# Patient Record
Sex: FEMALE | Race: WHITE
[De-identification: names, ages, dates, MRNs, and addresses within clinical notes are randomized per-mention and may not be internally consistent; named-entity substitution may affect disease eponyms.]

---

## 2017-02-09 ENCOUNTER — HOSPITAL ENCOUNTER (OUTPATIENT)
Dept: HOSPITAL 58 - LAB | Age: 42
Discharge: HOME | End: 2017-02-09
Attending: NURSE PRACTITIONER

## 2017-02-09 VITALS — BODY MASS INDEX: 29.7 KG/M2

## 2017-02-09 DIAGNOSIS — R05: ICD-10-CM

## 2017-02-09 DIAGNOSIS — J02.9: Primary | ICD-10-CM

## 2017-02-09 LAB
FLU INTERNAL QC: (no result)
FLUAV AG NPH QL: NEGATIVE
FLUBV AG NPH QL: NEGATIVE

## 2017-02-09 PROCEDURE — 87880 STREP A ASSAY W/OPTIC: CPT

## 2017-02-09 PROCEDURE — 87804 INFLUENZA ASSAY W/OPTIC: CPT

## 2017-02-09 PROCEDURE — 87651 STREP A DNA AMP PROBE: CPT

## 2017-03-31 ENCOUNTER — HOSPITAL ENCOUNTER (OUTPATIENT)
Dept: HOSPITAL 58 - RAD | Age: 42
Discharge: HOME | End: 2017-03-31
Attending: SPECIALIST

## 2017-03-31 VITALS — BODY MASS INDEX: 29.7 KG/M2

## 2017-03-31 DIAGNOSIS — R52: ICD-10-CM

## 2017-03-31 DIAGNOSIS — R30.0: ICD-10-CM

## 2017-03-31 DIAGNOSIS — R50.9: ICD-10-CM

## 2017-03-31 DIAGNOSIS — R31.9: Primary | ICD-10-CM

## 2017-03-31 LAB
ADD URINE MICROSCOPIC: YES
ALBUMIN SERPL-MCNC: 4.2 G/DL (ref 3.4–5)
ALBUMIN/GLOB SERPL: 1.4 {RATIO}
ALP SERPL-CCNC: 65 U/L (ref 42–98)
ALT SERPL-CCNC: 12 U/L (ref 12–78)
ANION GAP SERPL CALC-SCNC: 12.6 MMOL/L
AST SERPL-CCNC: 13 U/L (ref 15–37)
BACTERIA URNS QL MICRO: (no result)
BASOPHILS # BLD AUTO: 0.1 K/UL (ref 0–0.2)
BASOPHILS NFR BLD AUTO: 0.4 % (ref 0–3)
BILIRUB SERPL-MCNC: 0.8 MG/DL (ref 0–1.2)
BUN SERPL-MCNC: 7 MG/DL (ref 7–18)
BUN/CREAT SERPL: 8.75
CALCIUM SERPL-MCNC: 9 MG/DL (ref 8.2–10.2)
CHLORIDE SERPL-SCNC: 101 MMOL/L (ref 98–107)
CO2 BLD-SCNC: 28 MMOL/L (ref 21–32)
CREAT SERPL-MCNC: 0.8 MG/DL (ref 0.6–1.3)
EOSINOPHIL # BLD AUTO: 0 K/UL (ref 0–0.7)
EOSINOPHIL NFR BLD AUTO: 0.2 % (ref 0–7)
FLU INTERNAL QC: (no result)
FLUAV AG NPH QL: NEGATIVE
FLUBV AG NPH QL: NEGATIVE
GFR SERPLBLD BASED ON 1.73 SQ M-ARVRAT: 80 ML/MIN
GLOBULIN SER CALC-MCNC: 3 G/L
GLUCOSE SERPL-MCNC: 113 MG/DL (ref 70–110)
HCT VFR BLD AUTO: 41.3 % (ref 37–47)
HGB BLD-MCNC: 14.2 G/DL (ref 12–16)
IMM GRANULOCYTES NFR BLD AUTO: 0.2 % (ref 0–5)
LEUKOCYTE ESTERASE UR QL STRIP.AUTO: (no result)
LYMPHOCYTES # SPEC AUTO: 0.8 K/UL (ref 0.6–3.4)
LYMPHOCYTES NFR BLD AUTO: 6.3 % (ref 10–50)
MCH RBC QN: 28.2 PG (ref 27–31)
MCHC RBC AUTO-ENTMCNC: 34.4 G/DL (ref 31.8–35.4)
MCV RBC: 81.9 FL (ref 81–99)
MONOCYTES # BLD AUTO: 0.8 K/UL (ref 0.4–2)
MONOCYTES NFR BLD AUTO: 6.1 % (ref 0–10)
NEUTROPHILS # BLD AUTO: 11 K/UL (ref 2–6.9)
NEUTROPHILS NFR BLD AUTO: 86.8 %
PH UR: 8.5 [PH] (ref 5–9)
PLATELET # BLD AUTO: 248 10^3/UL (ref 140–440)
POTASSIUM SERPL-SCNC: 3.6 MMOL/L (ref 3.5–5.1)
PROT ?TM UR QN: (no result) G
PROT SERPL-MCNC: 7.2 G/DL (ref 6.4–8.2)
RBC # BLD AUTO: 5.04 10^6/UL (ref 4.2–5.4)
SODIUM SERPL-SCNC: 138 MMOL/L (ref 136–145)
SP GR UR: 1.01 (ref 1–1.03)
WBC # BLD AUTO: 12.7 K/UL (ref 4.6–10.2)

## 2017-03-31 PROCEDURE — 87086 URINE CULTURE/COLONY COUNT: CPT

## 2017-03-31 PROCEDURE — 81001 URINALYSIS AUTO W/SCOPE: CPT

## 2017-03-31 PROCEDURE — 80053 COMPREHEN METABOLIC PANEL: CPT

## 2017-03-31 PROCEDURE — 36415 COLL VENOUS BLD VENIPUNCTURE: CPT

## 2017-03-31 PROCEDURE — 87804 INFLUENZA ASSAY W/OPTIC: CPT

## 2017-03-31 PROCEDURE — 87186 SC STD MICRODIL/AGAR DIL: CPT

## 2017-03-31 PROCEDURE — 85025 COMPLETE CBC W/AUTO DIFF WBC: CPT

## 2017-03-31 NOTE — DI
EXAM:  KUB. 

  

History:  Hematuria. 

  

Comparison:  None available. 

  

Findings: Moderate colonic stool.  Nonobstructive bowel gas pattern.  No free intraperitoneal air.  
No acute osseous abnormalities.  Evaluation of the renal shadows is limited due to obscuration by demi
wel gas.  No definite radiopaque urinary tract calculi are identified. 

  

Impression:  No acute findings.  No definite radiopaque urinary tract calculi.

## 2017-09-15 ENCOUNTER — HOSPITAL ENCOUNTER (EMERGENCY)
Dept: HOSPITAL 58 - ED | Age: 42
Discharge: HOME | End: 2017-09-15

## 2017-09-15 VITALS — SYSTOLIC BLOOD PRESSURE: 150 MMHG | TEMPERATURE: 98 F | DIASTOLIC BLOOD PRESSURE: 97 MMHG

## 2017-09-15 VITALS — BODY MASS INDEX: 29.2 KG/M2

## 2017-09-15 DIAGNOSIS — S40.862A: ICD-10-CM

## 2017-09-15 DIAGNOSIS — S40.861A: ICD-10-CM
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
## 2017-09-15 DIAGNOSIS — S10.96XA: Primary | ICD-10-CM

## 2017-09-15 PROCEDURE — 99282 EMERGENCY DEPT VISIT SF MDM: CPT

## 2017-09-15 NOTE — ED.PDOC
General


ED Provider: 


Dr. BRENT REYES-ER





Chief Complaint: Rash


Stated Complaint: pamela got this rash and its itching--i also had small black bugs


Time Seen by Physician: 21:53


Mode of Arrival: Walk-In


Information Source: Patient


Primary Care Provider: 


MICHAEL MORENO-[facility] [address]





Nursing and Triage Documentation Reviewed and Agree: Yes





Skin Complaint Exam





- Skin Rash/Itching Complaint/Exam


Onset/Duration: 3 days


Symptoms Are: Still present


Initial Severity: Mild


Current Severity: Mild


Location: arms and neck


Potential Exposures: Reports: Insect bite, Mites, Scabies


Prior Treatment: prednisone 20mg


Aggravating: Reports: None


Alleviating: Reports: None


Associated Signs and Symptoms: Denies: Difficulty breathing, Fever, Chills


Skin Findings: Present: Urticaria, Target lesions, Lesions


Differential Diagnoses: Allergic Reaction, Other





Review of Systems





- Review Of Systems


Constitutional: Reports: No symptoms


Eyes: Reports: No symptoms


Ears, Nose, Mouth, Throat: Reports: No symptoms


Respiratory: Reports: No symptoms


Cardiac: Reports: No symptoms


GI: Reports: No symptoms


: Reports: No symptoms


Musculoskeletal: Reports: No symptoms


Skin: Reports: Lumps, Rash


Neurological: Reports: No symptoms


Endocrine: Reports: No symptoms


Hematologic/Lymphatic: Reports: No symptoms


All Other Systems: Reviewed and Negative





Past Medical History





- Past Medical History


Previously Healthy: Yes


Endocrine: Reports: None


Cardiovascular: Reports: Hypertension


Respiratory: Reports: None


Hematological: Reports: None


Gastrointestinal: Reports: None


Genitourinary: Reports: None


Neuro/Psych: Reports: None


Musculoskeletal: Reports: None


Cancer: Reports: None





- Surgical History


General Surgical History: Reports: Hysterectomy, Unknown





- Family History


Family History: Reports: Unknown





- Social History


Smoking Status: Former smoker, Light tobacco smoker


Hx Substance Use: No


Alcohol Screening: Occasionally





Physical Exam





- Physical Exam


Appearance: Well-appearing, No pain distress, Well-nourished


Eyes: REGLA, EOMI, Conjunctiva clear


ENT: Ears normal, Nose normal, Oropharynx normal


Neck: Supple


Respiratory: Airway patent


Cardiovascular: RRR, Pulses normal, No rub, No murmur


GI/: Soft


Musculoskeletal: Normal strength


Skin: Warm


Neurological: Sensation intact, Motor intact, Reflexes intact, Cranial nerves 

intact, Alert, Oriented


Psychiatric: Affect appropriate, Mood appropriate





Critical Care Note





- Critical Care Note


Total Time (mins): 0





Departure





- Departure


Time of Disposition: 21:55


Disposition: HOME SELF-CARE


Discharge Problem: 


Insect bites


Qualifiers:


 Encounter type: initial encounter Qualified Code(s): W57.XXXA - Bitten or 

stung by nonvenomous insect and other nonvenomous arthropods, initial encounter





Instructions:  Insect Bite or Sting (ED)


Condition: Good


Pt referred to PMD for follow-up: Yes


Additional Instructions: 


increase prednisone to 40mg x 3 days then 20mg x 3 days then 10mg x 3 days--

allegra 180mg q daily #30..tagamet 400mg bid #30--can use benadryl 50mg q 4hrs 

prn itching ---f/u clinic next week if not improving


Allergies/Adverse Reactions: 


Allergies





ampicillin Allergy (Intermediate, Verified 10/12/16 21:42)


 rash


methylprednisolone [From Medrol] Allergy (Intermediate, Verified 10/12/16 21:42)


 rash


Penicillins Allergy (Intermediate, Verified 10/12/16 21:42)


 rash


clindamycin Adverse Reaction (Verified 10/12/16 21:42)


 








Home Medications: 


Ambulatory Orders





Meloxicam [Mobic] 7.5 mg PO DAILY 03/24/16 


Hydrocodone Bit/Acetaminophen [Norco 7.5-325] 1 tab PO TID 08/27/16 


Tizanidine HCl [Zanaflex] 4 mg PO BEDTIME 09/14/17 








Disposition Discussed With: Patient

## 2018-02-20 ENCOUNTER — HOSPITAL ENCOUNTER (OUTPATIENT)
Dept: HOSPITAL 58 - LAB | Age: 43
Discharge: HOME | End: 2018-02-20
Attending: INTERNAL MEDICINE
Payer: COMMERCIAL

## 2018-02-20 VITALS — BODY MASS INDEX: 29.2 KG/M2

## 2018-02-20 DIAGNOSIS — R68.89: Primary | ICD-10-CM

## 2018-02-20 DIAGNOSIS — J06.9: ICD-10-CM

## 2018-02-20 PROCEDURE — 87651 STREP A DNA AMP PROBE: CPT

## 2018-02-20 PROCEDURE — 87804 INFLUENZA ASSAY W/OPTIC: CPT

## 2019-02-10 ENCOUNTER — HOSPITAL ENCOUNTER (EMERGENCY)
Dept: HOSPITAL 58 - ED | Age: 44
LOS: 1 days | Discharge: HOME | End: 2019-02-11
Payer: COMMERCIAL

## 2019-02-10 VITALS — BODY MASS INDEX: 38.2 KG/M2

## 2019-02-10 VITALS — TEMPERATURE: 98.7 F

## 2019-02-10 VITALS — SYSTOLIC BLOOD PRESSURE: 179 MMHG | DIASTOLIC BLOOD PRESSURE: 113 MMHG

## 2019-02-10 DIAGNOSIS — I10: ICD-10-CM

## 2019-02-10 DIAGNOSIS — F17.210: ICD-10-CM

## 2019-02-10 DIAGNOSIS — R60.0: Primary | ICD-10-CM

## 2019-02-10 DIAGNOSIS — Z79.899: ICD-10-CM

## 2019-02-10 DIAGNOSIS — I87.2: ICD-10-CM

## 2019-02-10 PROCEDURE — 99282 EMERGENCY DEPT VISIT SF MDM: CPT

## 2019-02-10 NOTE — ED.PDOC
General


ED Provider: 


Dr. ROD NATHAN





Chief Complaint: Extremity Swelling/Pain


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 

and has had some vein surgeries. She states that they have both been more 

swollen lately. cannot find a doctor since she does not want to give up her 

chronic pain medications. She admits to eating fastfoods daily. She tries to 

keep feet elevated and use stockings.


Time Seen by Physician: 23:32


Mode of Arrival: Walk-In


Information Source: Patient


Primary Care Provider: 


PARMJIT HOLT





Nursing and Triage Documentation Reviewed and Agree: Yes


Does patient meet sepsis criteria?: No


If yes, has appropriate treatment been initiated?: No


System Inflammatory Response Syndrome: Not Applicable


Sepsis Protocol: 


For patient's 13 years and over:





Temp is 96.8 and below  and greater


Pulse >90 BPM


Resp >20/minute


Acutely Altered Mental Status





Are patient's symptoms suggestive of a new infection, such as:


   -Pneumonia


   -Skin, Soft Tissue


   -Endocarditis


   -UTI


   -Bone, Joint Infection


   -Implantable Device


   -Acute Abdominal Infection


   -Wound Infection


   -Meningitis


   -Blood Stream Catheter Infection


   -Unknown








Musculoskeletal Complaint Exam





- Lower Extremity Complaint/Exam


Location of Pain: Reports: Right, Left, Leg


Mechanism of Injury: Reports: No known trauma


Onset/Duration: months 


Symptoms Are: Still present


Location: Reports: Diffuse


Character: Reports: Unable to describe


DVT Risk Factors: Reports: None


Septic Arthritis Risk Factors: Reports: None


Lower Extremity Findings: Present: Swelling (bilaterally and symetric 2+ pittin 

edema. )


Compartment Syndrome Risk Factors: Absent: Pain, Paralysis, Pallor, 

Pulselessness, Paresthesias


Kassie's Sign Present: No


Differential Diagnoses: Other (lower extrmity edema )





Review of Systems





- Review Of Systems


Constitutional: Reports: No symptoms


Eyes: Reports: No symptoms


Ears, Nose, Mouth, Throat: Reports: No symptoms


Respiratory: Reports: No symptoms


Cardiac: Reports: No symptoms


GI: Reports: No symptoms


: Reports: No symptoms


Musculoskeletal: Reports: Back pain, Joint pain, Joint swelling


Skin: Reports: No symptoms


Neurological: Reports: Anxiety


Endocrine: Reports: No symptoms


Hematologic/Lymphatic: Reports: No symptoms


All Other Systems: Reviewed and Negative





Past Medical History





- Past Medical History


Previously Healthy: Yes


Endocrine: Reports: None


Cardiovascular: Reports: Hypertension


Respiratory: Reports: None


Hematological: Reports: None


Gastrointestinal: Reports: None


Genitourinary: Reports: None


Neuro/Psych: Reports: None


Musculoskeletal: Reports: Arthritis, Back Pain, Joint Pain


Cancer: Reports: None


Last Menstrual Period: HAS HAD A HYSTERECTOMY





- Surgical History


General Surgical History: Reports: Hysterectomy, Other (vein surgery bilateral 

lower extremities )





- Family History


Family History: Reports: Unknown





- Social History


Smoking Status: Current every day smoker, Light tobacco smoker


Hx Substance Use: No


Alcohol Screening: Occasionally





- Immunizations


Tetanus Shot up to Date:  (UNKNOWN)





Physical Exam





- Physical Exam


Appearance: Ill-appearing, Obese


Ill-appearing: Mild


Pain Distress: Moderate (back and neck)


Eyes: REGLA, EOMI, Conjunctiva clear


ENT: Ears normal, Nose normal, Oropharynx normal


Neck: Supple


Respiratory: Airway patent, Breath sounds clear, Breath sounds equal, 

Respirations nonlabored


Cardiovascular: RRR, Pulses normal, No rub, No murmur


GI/: Soft, Nontender, No masses, Bowel sounds normal, No Organomegaly


Musculoskeletal: Normal strength, ROM intact, No calf tenderness, Edema


Skin: Warm, Dry, Normal color


Neurological: Sensation intact, Motor intact, Reflexes intact, Cranial nerves 

intact, Alert, Oriented


Psychiatric: Anxious





Critical Care Note





- Critical Care Note


Total Time (mins): 0





Course





- Course


Orders, Labs, Meds: 


Orders











 Category Date Time Status


 


 Hydralazine HCl [Apresoline] MEDS  02/10/19 23:53 Discontinued





 25 mg PO ONCE STA   








Medications














Discontinued Medications














Generic Name Dose Route Start Last Admin





  Trade Name Freq  PRN Reason Stop Dose Admin


 


Hydralazine HCl  25 mg  02/10/19 23:53  





  Apresoline  PO  02/10/19 23:54  





  ONCE STA   





     





     





     





     











Vital Signs: 


 











  Temp Pulse Resp BP Pulse Ox


 


 02/10/19 23:52   92 H  18  179/113 H  96


 


 02/10/19 23:05  98.7 F  93 H  18  171/108 H  96














Departure





- Departure


Time of Disposition: 23:41


Disposition: HOME SELF-CARE


Discharge Problem: 


 Edema of lower extremity





Hypertension


Qualifiers:


 Hypertension type: essential hypertension Qualified Code(s): I10 - Essential (

primary) hypertension





Instructions:  Leg Edema (ED), Hypertension (ED), Venous Insufficiency (DC)


Condition: Stable


Pt referred to PMD for follow-up: Yes


IPMP verified?: No


Additional Instructions: 


AVOID SALTY FOOD, FAST FOODS AND LUNCH MEATS HAVE A LOT OF SALT SO DOES PIZZA. 


MAY USE SEASONING OR MRS DASH.


FIND A PRIMARY CARE PROVIDER WHO CAN MONITOR YOU BLOOD PRESSURE AND LEG EDEMA.


take medication ( HCTZ ) as prescribed daily for blood pressure and Edema. 


Prescriptions: 


Hydrochlorothiazide 12.5 mg PO DAILY LAB #30 tablet


Allergies/Adverse Reactions: 


Allergies





ampicillin Allergy (Intermediate, Verified 02/10/19 23:16)


 rash


methylprednisolone [From Medrol] Allergy (Intermediate, Verified 02/10/19 23:16)


 rash


Penicillins Allergy (Intermediate, Verified 02/10/19 23:16)


 rash


clindamycin Adverse Reaction (Verified 02/10/19 23:16)


 








Home Medications: 


Ambulatory Orders





Hydrocodone Bit/Acetaminophen [Norco 7.5-325] 1 tab PO QID 08/27/16 


Hydrochlorothiazide 12.5 mg PO DAILY LAB #30 tablet 02/10/19 


Naproxen Sodium [Aleve] 1 tab PO BID PRN 02/10/19 








Disposition Discussed With: Patient, Family

## 2019-03-11 ENCOUNTER — HOSPITAL ENCOUNTER (EMERGENCY)
Dept: HOSPITAL 58 - ED | Age: 44
LOS: 1 days | Discharge: HOME | End: 2019-03-12

## 2019-03-11 VITALS — BODY MASS INDEX: 35.4 KG/M2

## 2019-03-11 VITALS — DIASTOLIC BLOOD PRESSURE: 92 MMHG | SYSTOLIC BLOOD PRESSURE: 138 MMHG | TEMPERATURE: 98.5 F

## 2019-03-11 DIAGNOSIS — R25.2: ICD-10-CM

## 2019-03-11 DIAGNOSIS — R10.9: Primary | ICD-10-CM

## 2019-03-11 DIAGNOSIS — M62.838: ICD-10-CM

## 2019-03-11 DIAGNOSIS — Z72.0: ICD-10-CM

## 2019-03-11 DIAGNOSIS — M79.669: ICD-10-CM

## 2019-03-11 DIAGNOSIS — R91.1: ICD-10-CM

## 2019-03-11 PROCEDURE — 85651 RBC SED RATE NONAUTOMATED: CPT

## 2019-03-11 PROCEDURE — 84484 ASSAY OF TROPONIN QUANT: CPT

## 2019-03-11 PROCEDURE — 93005 ELECTROCARDIOGRAM TRACING: CPT

## 2019-03-11 PROCEDURE — 93010 ELECTROCARDIOGRAM REPORT: CPT

## 2019-03-11 PROCEDURE — 96361 HYDRATE IV INFUSION ADD-ON: CPT

## 2019-03-11 PROCEDURE — 96360 HYDRATION IV INFUSION INIT: CPT

## 2019-03-11 PROCEDURE — 99284 EMERGENCY DEPT VISIT MOD MDM: CPT

## 2019-03-11 PROCEDURE — 81001 URINALYSIS AUTO W/SCOPE: CPT

## 2019-03-11 PROCEDURE — 36415 COLL VENOUS BLD VENIPUNCTURE: CPT

## 2019-03-11 PROCEDURE — 82550 ASSAY OF CK (CPK): CPT

## 2019-03-11 PROCEDURE — 80053 COMPREHEN METABOLIC PANEL: CPT

## 2019-03-11 PROCEDURE — 85025 COMPLETE CBC W/AUTO DIFF WBC: CPT

## 2019-03-11 PROCEDURE — 84443 ASSAY THYROID STIM HORMONE: CPT

## 2019-03-12 RX ADMIN — SODIUM CHLORIDE STA MLS/HR: 0.9 INJECTION, SOLUTION INTRAVENOUS at 01:33

## 2019-03-12 NOTE — CT
EXAM: CT brain without contrast 

  

  

  

HISTORY:  Pain and weakness 

  

  

  

TECHNIQUE:  CT of the brain without intravenous contrast 

  

FINDINGS:  There is no acute hemorrhage midline shift or mass effect.  No hydrocephalus or abnormal e
xtra-axial fluid collection.  No significant parenchymal attenuation abnormality.  The bony cranium a
ppears normal.  The visualized paranasal sinuses are clear. Soft tissues without significant abnormal
ity. 

  

IMPRESSION: 

1.  CT of the brain within normal limits.

## 2019-03-12 NOTE — ED.PDOC
General


ED Provider: 


Dr. BRENT REYES-ER





Chief Complaint: Abdominal Pain


Stated Complaint: all of my muscles are cramping--i think its myh bp meds---it 

has a fluid pill in it


Time Seen by Physician: 23:40


Mode of Arrival: Walk-In


Information Source: Patient


Exam Limitations: No limitations


Primary Care Provider: 


PARMJIT HLOT





Nursing and Triage Documentation Reviewed and Agree: Yes


Does patient meet sepsis criteria?: No


System Inflammatory Response Syndrome: Not Applicable


Sepsis Protocol: 


For patient's 13 years and over:





Temp is 96.8 and below  and greater


Pulse >90 BPM


Resp >20/minute


Acutely Altered Mental Status





Are patient's symptoms suggestive of a new infection, such as:


   -Pneumonia


   -Skin, Soft Tissue


   -Endocarditis


   -UTI


   -Bone, Joint Infection


   -Implantable Device


   -Acute Abdominal Infection


   -Wound Infection


   -Meningitis


   -Blood Stream Catheter Infection


   -Unknown








Musculoskeletal Complaint Exam





- Lower Extremity Complaint/Exam


Location of Pain: Reports: Leg


Mechanism of Injury: Reports: No known trauma


Symptoms Are: Resolved


Onset of Pain: Reports: Seconds


Initial Severity: Mild


Current Severity: Mild


Location: Reports: Diffuse


Character: Reports: Spasmodic


Aggravating: Reports: Movement


Able to Bear Weight: Yes


Associated Signs and Symptoms: Denies: Swelling, Redness, Bruising, Fever, 

Weakness, Numbness, Tingling


Lower Extremity Findings: Absent: Swelling, Ecchymosis, Abnormal contour, 

Rotation, Ligamentous instability, Laceration, Erythema, Warmth, Blisters, 

Other joint pain, Foreign body, Tenderness, Limited range of motion


NV Bundle Intact Distal to Injury: Yes


Compartment Syndrome Risk Factors: Present: Pain


Kassie's Sign Present: No


Differential Diagnoses: Other





Review of Systems





- Review Of Systems


Constitutional: Reports: No symptoms


Eyes: Reports: No symptoms


Ears, Nose, Mouth, Throat: Reports: No symptoms


Respiratory: Reports: No symptoms


Cardiac: Reports: No symptoms


GI: Reports: No symptoms


: Reports: No symptoms


Musculoskeletal: Reports: Muscle pain


Skin: Reports: No symptoms


Neurological: Reports: No symptoms


Endocrine: Reports: No symptoms


Hematologic/Lymphatic: Reports: No symptoms


All Other Systems: Reviewed and Negative





Past Medical History





- Past Medical History


Previously Healthy: Yes


Endocrine: Reports: None


Cardiovascular: Reports: Hypertension


Respiratory: Reports: None


Hematological: Reports: None


Gastrointestinal: Reports: None


Genitourinary: Reports: None


Neuro/Psych: Reports: None


Musculoskeletal: Reports: Arthritis, Back Pain, Joint Pain


Cancer: Reports: None


Last Menstrual Period: none





- Surgical History


General Surgical History: Reports: Hysterectomy, Other (vein surgery bilateral 

lower extremities )





- Family History


Family History: Reports: Unknown





- Social History


Smoking Status: Current every day smoker, Light tobacco smoker


Hx Substance Use: No


Alcohol Screening: None





- Immunizations


Tetanus Shot up to Date: Yes





Physical Exam





- Physical Exam


Appearance: Well-appearing, No pain distress, Well-nourished


Eyes: REGLA, EOMI, Conjunctiva clear


ENT: Ears normal, Nose normal, Oropharynx normal


Neck: Supple


Respiratory: Airway patent, Breath sounds clear, Breath sounds equal, 

Respirations nonlabored


Cardiovascular: RRR, Pulses normal, No rub, No murmur


GI/: Soft, Nontender, No masses, Bowel sounds normal, No Organomegaly


Musculoskeletal: Normal strength, ROM intact, No edema, No calf tenderness


Skin: Warm


Neurological: Sensation intact


Psychiatric: Affect appropriate





Interpretation





- Radiology Interpretation


Radiology Interpretation By: Radiologist


Radiology Results: Negative


Exam Interpreted: CT Scan





Critical Care Note





- Critical Care Note


Total Time (mins): 0





Course





- Course


Hematology/Chemistry: 


 03/12/19 00:15





 03/12/19 00:15


Orders, Labs, Meds: 





Lab Review











  03/11/19 03/12/19 03/12/19





  23:50 00:15 00:15


 


WBC   6.39 


 


RBC   4.70 


 


Hgb   12.9 


 


Hct   38.0 


 


MCV   80.9 L 


 


MCH   27.4 


 


MCHC   33.9 


 


RDW Coeff of Anish   12.3 


 


Plt Count   278 


 


Immature Gran % (Auto)   0.3 


 


Neut % (Auto)   64.5 


 


Lymph % (Auto)   18.8 


 


Mono % (Auto)   11.4 H 


 


Eos % (Auto)   4.1 


 


Baso % (Auto)   0.9 


 


Immature Gran # (Auto)   0.0 


 


Neut # (Auto)   4.1 


 


Lymph # (Auto)   1.2 


 


Mono # (Auto)   0.7 


 


Eos # (Auto)   0.3 


 


Baso # (Auto)   0.1 


 


ESR   


 


Sodium    137.1


 


Potassium    3.94


 


Chloride    102.3


 


Carbon Dioxide    25.6


 


Anion Gap    13.14


 


BUN    8.6


 


Creatinine    0.69


 


Estimated GFR (MDRD)    94.00


 


BUN/Creatinine Ratio    12.46


 


Glucose    100.3


 


Calcium    8.79


 


Total Bilirubin    0.25


 


AST    28.5


 


ALT    20.9


 


Alkaline Phosphatase    58.2


 


Total Creatine Kinase    85.7


 


Troponin I    < 0.012


 


Total Protein    7.03


 


Albumin    3.98


 


Globulin    3.05


 


Albumin/Globulin Ratio    1.30


 


TSH   


 


Urine Color  Yellow  


 


Urine Clarity  Clear  


 


Urine pH  6.5  


 


Ur Specific Gravity  1.015  


 


Urine Protein  Negative  


 


Urine Glucose (UA)  Negative  


 


Urine Ketones  Negative  


 


Urine Blood  Negative  


 


Urine Nitrite  Negative  


 


Urine Bilirubin  Negative  


 


Urine Urobilinogen  0.2  


 


Ur Leukocyte Esterase  Negative  














  03/12/19 03/12/19





  00:15 00:15


 


WBC  


 


RBC  


 


Hgb  


 


Hct  


 


MCV  


 


MCH  


 


MCHC  


 


RDW Coeff of Anish  


 


Plt Count  


 


Immature Gran % (Auto)  


 


Neut % (Auto)  


 


Lymph % (Auto)  


 


Mono % (Auto)  


 


Eos % (Auto)  


 


Baso % (Auto)  


 


Immature Gran # (Auto)  


 


Neut # (Auto)  


 


Lymph # (Auto)  


 


Mono # (Auto)  


 


Eos # (Auto)  


 


Baso # (Auto)  


 


ESR  8 


 


Sodium  


 


Potassium  


 


Chloride  


 


Carbon Dioxide  


 


Anion Gap  


 


BUN  


 


Creatinine  


 


Estimated GFR (MDRD)  


 


BUN/Creatinine Ratio  


 


Glucose  


 


Calcium  


 


Total Bilirubin  


 


AST  


 


ALT  


 


Alkaline Phosphatase  


 


Total Creatine Kinase  


 


Troponin I  


 


Total Protein  


 


Albumin  


 


Globulin  


 


Albumin/Globulin Ratio  


 


TSH   2.160


 


Urine Color  


 


Urine Clarity  


 


Urine pH  


 


Ur Specific Gravity  


 


Urine Protein  


 


Urine Glucose (UA)  


 


Urine Ketones  


 


Urine Blood  


 


Urine Nitrite  


 


Urine Bilirubin  


 


Urine Urobilinogen  


 


Ur Leukocyte Esterase  








Orders











 Category Date Time Status


 


 EKG-(ED ONLY) Stat CARDIO  03/11/19 23:48 Completed


 


 ED IV/MEDIPORT/POWERPORT .ONCE EMERGENCY  03/12/19 01:07 Active


 


 CBC W/ AUTO DIFF Stat LAB  03/11/19 23:48 Completed


 


 COMPREHENSIVE METABOLIC PANEL Stat LAB  03/11/19 23:48 Completed


 


 CREATINE KINASE Stat LAB  03/11/19 23:48 Completed


 


 ESR Stat LAB  03/12/19 00:15 Completed


 


 THYROID STIMULATING HORMONE Stat LAB  03/12/19 00:15 Completed


 


 TROPONIN I Stat LAB  03/11/19 23:48 Completed


 


 URINALYSIS C & S IF INDICATED Stat LAB  03/11/19 23:50 Completed


 


 0.9 % Sodium Chloride [Saline Flush] MEDS  03/12/19 01:07 Ordered





 1 syr IVF PRN PRN   


 


 Sodium Chloride 0.9% [Sodium Chloride] 1,000 ml MEDS  03/12/19 01:07 

Discontinued





 IV BOLUS   


 


 CT ABDOMEN/PELVIS WO CONTRAST Stat RADS  03/11/19 23:45 Completed


 


 CT HEAD W/O CONTRAST Stat RADS  03/11/19 23:45 Completed








Medications











Generic Name Dose Route Start Last Admin





  Trade Name Freq  PRN Reason Stop Dose Admin


 


Sodium Chloride  1 syr  03/12/19 01:07  





  Saline Flush  IVF   





  PRN PRN   





  To flush IV   





     





     





     














Discontinued Medications














Generic Name Dose Route Start Last Admin





  Trade Name Freq  PRN Reason Stop Dose Admin


 


Sodium Chloride  1,000 mls @ 250 mls/hr  03/12/19 01:07  03/12/19 01:33





  Sodium Chloride  IV  03/12/19 05:06  250 mls/hr





  BOLUS STA   Administration





     





     





     





     











Vital Signs: 





 











  Temp Pulse Resp BP Pulse Ox


 


 03/11/19 23:35  98.5 F  89  18  138/92 H  97














Departure





- Departure


Time of Disposition: 05:31


Disposition: HOME SELF-CARE


Discharge Problem: 


 Muscle spasm, Lung nodule





Instructions:  Muscle Spasm (ED)


Condition: Good


Pt referred to PMD for follow-up: Yes


IPMP verified?: No


Additional Instructions: 


talk to your pcp about change in bp meds and also setting up surveillance xrays 

of lung nodule(RLL)


Allergies/Adverse Reactions: 


Allergies





ampicillin Allergy (Intermediate, Verified 03/11/19 23:39)


 rash


methylprednisolone [From Medrol] Allergy (Intermediate, Verified 03/11/19 23:39)


 rash


Penicillins Allergy (Intermediate, Verified 03/11/19 23:39)


 rash


clindamycin Adverse Reaction (Verified 03/11/19 23:39)


 








Home Medications: 


Ambulatory Orders





Hydrocodone Bit/Acetaminophen [Norco 7.5-325] 1 tab PO QID 08/27/16 


Hydrochlorothiazide 12.5 mg PO DAILY LAB #30 tablet 02/10/19 








Disposition Discussed With: Patient, Family

## 2019-03-12 NOTE — CT
Exam:  CT of the abdomen pelvis without contrast 

  

History:  Abdominal pain 

  

Technique:  8 mm CT of the abdomen and pelvis without intravascular contrast 

  

FINDINGS:  There is a 3.7 mm nodule in the posterior right lower lobe.  The lung bases are clear othe
rwise.  No significant liver abnormality.  The adrenals, pancreas and spleen are unremarkable.  The s
tomach and hiatus are unremarkable.The gallbladder appears normal.  Kidneys and proximal collecting s
ystem are unremarkable.  The appendix is normal.  Bowel loops demonstrate normal caliber.  No inflama
tory change seen in the mesentery or retroperitoneum.  Vascular structures appear normal by noncontra
st CT.  There are some prominent but sub pathologic periaortic lymph nodes measuring maximum short ax
is of 8 mm. 

  

Prior hysterectomy.  Normal urinary bladder.  Normal pelvic bowel loops.  No pelvic fat inflammation.
  No acute findings of the skeleton. 

  

Impression: 

1.  No inflammatory process, bowel or urinary obstruction is seen. 

2.  Prominent, sub pathologic periaortic lymph nodes are nonspecific 

3.  The there is a 3.7 mm indeterminate nodule of the right lower lobe.  Consensus recomendations for
 nodule 6mm or less: 

Low Risk patient, no follow-up needed. 

High Risk patient, Optional follow-up CT at 12 mo